# Patient Record
Sex: MALE | Race: WHITE | NOT HISPANIC OR LATINO | Employment: FULL TIME | URBAN - METROPOLITAN AREA
[De-identification: names, ages, dates, MRNs, and addresses within clinical notes are randomized per-mention and may not be internally consistent; named-entity substitution may affect disease eponyms.]

---

## 2024-08-15 PROBLEM — K59.00 CONSTIPATION: Status: RESOLVED | Noted: 2024-08-15 | Resolved: 2024-08-15

## 2024-08-15 PROBLEM — Z00.00 HEALTHCARE MAINTENANCE: Status: ACTIVE | Noted: 2024-08-15

## 2024-08-26 ENCOUNTER — LAB (OUTPATIENT)
Dept: LAB | Facility: LAB | Age: 34
End: 2024-08-26
Payer: COMMERCIAL

## 2024-08-26 ENCOUNTER — APPOINTMENT (OUTPATIENT)
Dept: PRIMARY CARE | Facility: CLINIC | Age: 34
End: 2024-08-26
Payer: COMMERCIAL

## 2024-08-26 VITALS
TEMPERATURE: 98.2 F | SYSTOLIC BLOOD PRESSURE: 138 MMHG | OXYGEN SATURATION: 99 % | BODY MASS INDEX: 40.54 KG/M2 | HEIGHT: 70 IN | HEART RATE: 80 BPM | DIASTOLIC BLOOD PRESSURE: 88 MMHG | WEIGHT: 283.2 LBS

## 2024-08-26 DIAGNOSIS — J01.90 ACUTE SINUSITIS, RECURRENCE NOT SPECIFIED, UNSPECIFIED LOCATION: ICD-10-CM

## 2024-08-26 DIAGNOSIS — Z00.00 HEALTHCARE MAINTENANCE: Primary | ICD-10-CM

## 2024-08-26 DIAGNOSIS — R53.83 FATIGUE, UNSPECIFIED TYPE: ICD-10-CM

## 2024-08-26 DIAGNOSIS — E66.09 CLASS 1 OBESITY DUE TO EXCESS CALORIES WITHOUT SERIOUS COMORBIDITY WITH BODY MASS INDEX (BMI) OF 30.0 TO 30.9 IN ADULT: ICD-10-CM

## 2024-08-26 DIAGNOSIS — J30.9 ALLERGIC RHINITIS, UNSPECIFIED SEASONALITY, UNSPECIFIED TRIGGER: ICD-10-CM

## 2024-08-26 DIAGNOSIS — E53.8 VITAMIN B12 DEFICIENCY: ICD-10-CM

## 2024-08-26 DIAGNOSIS — R42 VERTIGO: ICD-10-CM

## 2024-08-26 DIAGNOSIS — E55.9 VITAMIN D DEFICIENCY: Primary | ICD-10-CM

## 2024-08-26 DIAGNOSIS — R05.9 COUGH, UNSPECIFIED TYPE: ICD-10-CM

## 2024-08-26 DIAGNOSIS — Z00.00 HEALTHCARE MAINTENANCE: ICD-10-CM

## 2024-08-26 DIAGNOSIS — F40.248 FEAR OF PUBLIC SPEAKING: ICD-10-CM

## 2024-08-26 PROBLEM — E66.811 CLASS 1 OBESITY DUE TO EXCESS CALORIES WITHOUT SERIOUS COMORBIDITY WITH BODY MASS INDEX (BMI) OF 30.0 TO 30.9 IN ADULT: Status: ACTIVE | Noted: 2024-08-26

## 2024-08-26 LAB
25(OH)D3 SERPL-MCNC: 20 NG/ML (ref 30–100)
ALBUMIN SERPL BCP-MCNC: 4.7 G/DL (ref 3.4–5)
ALP SERPL-CCNC: 61 U/L (ref 33–120)
ALT SERPL W P-5'-P-CCNC: 33 U/L (ref 10–52)
ANION GAP SERPL CALC-SCNC: 13 MMOL/L (ref 10–20)
AST SERPL W P-5'-P-CCNC: 22 U/L (ref 9–39)
BASOPHILS # BLD AUTO: 0.04 X10*3/UL (ref 0–0.1)
BASOPHILS NFR BLD AUTO: 0.4 %
BILIRUB SERPL-MCNC: 0.4 MG/DL (ref 0–1.2)
BUN SERPL-MCNC: 10 MG/DL (ref 6–23)
CALCIUM SERPL-MCNC: 9.3 MG/DL (ref 8.6–10.3)
CHLORIDE SERPL-SCNC: 103 MMOL/L (ref 98–107)
CHOLEST SERPL-MCNC: 204 MG/DL (ref 0–199)
CHOLESTEROL/HDL RATIO: 5.6
CO2 SERPL-SCNC: 27 MMOL/L (ref 21–32)
CREAT SERPL-MCNC: 0.77 MG/DL (ref 0.5–1.3)
EGFRCR SERPLBLD CKD-EPI 2021: >90 ML/MIN/1.73M*2
EOSINOPHIL # BLD AUTO: 0.13 X10*3/UL (ref 0–0.7)
EOSINOPHIL NFR BLD AUTO: 1.3 %
ERYTHROCYTE [DISTWIDTH] IN BLOOD BY AUTOMATED COUNT: 14.6 % (ref 11.5–14.5)
GLUCOSE SERPL-MCNC: 90 MG/DL (ref 74–99)
HCT VFR BLD AUTO: 43.3 % (ref 41–52)
HDLC SERPL-MCNC: 36.7 MG/DL
HGB BLD-MCNC: 14.1 G/DL (ref 13.5–17.5)
IMM GRANULOCYTES # BLD AUTO: 0.03 X10*3/UL (ref 0–0.7)
IMM GRANULOCYTES NFR BLD AUTO: 0.3 % (ref 0–0.9)
LDLC SERPL CALC-MCNC: 135 MG/DL
LYMPHOCYTES # BLD AUTO: 2.54 X10*3/UL (ref 1.2–4.8)
LYMPHOCYTES NFR BLD AUTO: 25.1 %
MCH RBC QN AUTO: 26.2 PG (ref 26–34)
MCHC RBC AUTO-ENTMCNC: 32.6 G/DL (ref 32–36)
MCV RBC AUTO: 81 FL (ref 80–100)
MONOCYTES # BLD AUTO: 0.71 X10*3/UL (ref 0.1–1)
MONOCYTES NFR BLD AUTO: 7 %
NEUTROPHILS # BLD AUTO: 6.68 X10*3/UL (ref 1.2–7.7)
NEUTROPHILS NFR BLD AUTO: 65.9 %
NON HDL CHOLESTEROL: 167 MG/DL (ref 0–149)
NRBC BLD-RTO: 0 /100 WBCS (ref 0–0)
PLATELET # BLD AUTO: 292 X10*3/UL (ref 150–450)
POTASSIUM SERPL-SCNC: 3.8 MMOL/L (ref 3.5–5.3)
PROT SERPL-MCNC: 7.4 G/DL (ref 6.4–8.2)
RBC # BLD AUTO: 5.38 X10*6/UL (ref 4.5–5.9)
SODIUM SERPL-SCNC: 139 MMOL/L (ref 136–145)
TRIGL SERPL-MCNC: 162 MG/DL (ref 0–149)
TSH SERPL-ACNC: 2.24 MIU/L (ref 0.44–3.98)
VIT B12 SERPL-MCNC: 206 PG/ML (ref 211–911)
VLDL: 32 MG/DL (ref 0–40)
WBC # BLD AUTO: 10.1 X10*3/UL (ref 4.4–11.3)

## 2024-08-26 PROCEDURE — 1036F TOBACCO NON-USER: CPT | Performed by: FAMILY MEDICINE

## 2024-08-26 PROCEDURE — 99395 PREV VISIT EST AGE 18-39: CPT | Performed by: FAMILY MEDICINE

## 2024-08-26 PROCEDURE — 82607 VITAMIN B-12: CPT

## 2024-08-26 PROCEDURE — 85025 COMPLETE CBC W/AUTO DIFF WBC: CPT

## 2024-08-26 PROCEDURE — 36415 COLL VENOUS BLD VENIPUNCTURE: CPT

## 2024-08-26 PROCEDURE — 84443 ASSAY THYROID STIM HORMONE: CPT

## 2024-08-26 PROCEDURE — 80053 COMPREHEN METABOLIC PANEL: CPT

## 2024-08-26 PROCEDURE — 80061 LIPID PANEL: CPT

## 2024-08-26 PROCEDURE — 99214 OFFICE O/P EST MOD 30 MIN: CPT | Performed by: FAMILY MEDICINE

## 2024-08-26 PROCEDURE — 82306 VITAMIN D 25 HYDROXY: CPT

## 2024-08-26 PROCEDURE — 3008F BODY MASS INDEX DOCD: CPT | Performed by: FAMILY MEDICINE

## 2024-08-26 RX ORDER — FLUTICASONE PROPIONATE AND SALMETEROL 250; 50 UG/1; UG/1
1 POWDER RESPIRATORY (INHALATION)
Qty: 60 EACH | Refills: 2 | Status: SHIPPED | OUTPATIENT
Start: 2024-08-26 | End: 2024-11-24

## 2024-08-26 RX ORDER — PROPRANOLOL HYDROCHLORIDE 10 MG/1
10 TABLET ORAL 2 TIMES DAILY PRN
Qty: 30 TABLET | Refills: 0 | Status: SHIPPED | OUTPATIENT
Start: 2024-08-26 | End: 2025-08-26

## 2024-08-26 RX ORDER — FLUTICASONE PROPIONATE 50 MCG
1 SPRAY, SUSPENSION (ML) NASAL DAILY
Qty: 16 G | Refills: 2 | Status: SHIPPED | OUTPATIENT
Start: 2024-08-26 | End: 2025-08-26

## 2024-08-26 RX ORDER — LANOLIN ALCOHOL/MO/W.PET/CERES
1000 CREAM (GRAM) TOPICAL DAILY
Qty: 90 TABLET | Refills: 1 | Status: SHIPPED | OUTPATIENT
Start: 2024-08-26 | End: 2025-02-22

## 2024-08-26 RX ORDER — DOXYCYCLINE 100 MG/1
100 CAPSULE ORAL 2 TIMES DAILY
Qty: 20 CAPSULE | Refills: 0 | Status: SHIPPED | OUTPATIENT
Start: 2024-08-26 | End: 2024-09-05

## 2024-08-26 RX ORDER — ERGOCALCIFEROL 1.25 MG/1
50000 CAPSULE ORAL
Qty: 13 CAPSULE | Refills: 1 | Status: SHIPPED | OUTPATIENT
Start: 2024-09-01 | End: 2025-03-02

## 2024-08-26 ASSESSMENT — PATIENT HEALTH QUESTIONNAIRE - PHQ9
2. FEELING DOWN, DEPRESSED OR HOPELESS: NOT AT ALL
1. LITTLE INTEREST OR PLEASURE IN DOING THINGS: NOT AT ALL
SUM OF ALL RESPONSES TO PHQ9 QUESTIONS 1 AND 2: 0

## 2024-08-26 NOTE — ASSESSMENT & PLAN NOTE
Patient may have a component of reactive airway disease or asthma.  Will start him on Advair in addition to the antibiotic and he can also use albuterol as needed.  Follow-up in 1 month or sooner with any concerns.

## 2024-08-26 NOTE — ASSESSMENT & PLAN NOTE
Continue to work on healthy diet and exercise.  Screening blood work ordered.  Immunizations up-to-date.

## 2024-08-26 NOTE — ASSESSMENT & PLAN NOTE
Patient complaining of ulnar 7-day history of sinus congestion and cough reports symptoms are worsening.  Will treat with doxycycline and Flonase.  Patient also to see ENT due to recurrent

## 2024-08-26 NOTE — ASSESSMENT & PLAN NOTE
Reviewed that some of patient's symptoms may be related to allergies.  Patient did not feel that allergy medicine helped.  Will have him start Flonase and follow-up in a month.

## 2024-08-26 NOTE — PROGRESS NOTES
Subjective   Patient ID: Alan Newton is a 34 y.o. male who presents for Annual Exam (AWV  - pt has some chest congestion started 8/21 has not tested for covid).  Patient presents today for a physical and also has several other concerns.  He states that several times a year he gets congestion and cough with green nasal drainage.  He states that other family members get sick but his usually last several weeks.  He states that he has a cough that is productive.  He has not had any fevers or chills.  He states this has been going on over a week.  He denies any chest pain or shortness of breath.  No vomiting or diarrhea.  He does not have a history of asthma.  He does have an albuterol inhaler that he tried and thinks helped a little bit.  He is not certain what else to take.  He states that he was given a Medrol Dosepak in the past and this caused a lot of redness but he thinks it may have helped his breathing.  He states that he also feels like a spinning sensation from time to time.  He states that he has always had motion sickness but that it is worse when he is congested like this.  He also was interested in trying to lose weight.  He also has concerns about public speaking.  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  He states he does not feel anxious or worried otherwise.  He denies any other concerns.  HPI  Social History     Socioeconomic History    Marital status:      Spouse name: Not on file    Number of children: Not on file    Years of education: Not on file    Highest education level: Not on file   Occupational History    Not on file   Tobacco Use    Smoking status: Never    Smokeless tobacco: Never   Substance and Sexual Activity    Alcohol use: Not Currently    Drug use: Not Currently    Sexual activity: Not on file   Other Topics Concern    Not on file   Social History Narrative    Not on  file     Social Determinants of Health     Financial Resource Strain: Not on file   Food Insecurity: Not on file   Transportation Needs: Not on file   Physical Activity: Not on file   Stress: Not on file   Social Connections: Not on file   Intimate Partner Violence: Not on file   Housing Stability: Not on file     Current Outpatient Medications   Medication Sig Dispense Refill    cyanocobalamin (Vitamin B-12) 1,000 mcg tablet Take 1 tablet (1,000 mcg) by mouth once daily. 90 tablet 1    doxycycline (Vibramycin) 100 mg capsule Take 1 capsule (100 mg) by mouth 2 times a day for 10 days. Take with at least 8 ounces (large glass) of water, do not lie down for 30 minutes after 20 capsule 0    [START ON 9/1/2024] ergocalciferol (Vitamin D-2) 1.25 MG (42754 UT) capsule Take 1 capsule (50,000 Units) by mouth 1 (one) time per week. 13 capsule 1    fluticasone (Flonase) 50 mcg/actuation nasal spray Administer 1 spray into each nostril once daily. Shake gently. Before first use, prime pump. After use, clean tip and replace cap. 16 g 2    fluticasone propion-salmeteroL (Advair Diskus) 250-50 mcg/dose diskus inhaler Inhale 1 puff 2 times a day. Rinse mouth with water after use to reduce aftertaste and incidence of candidiasis. Do not swallow. 60 each 2    propranolol (Inderal) 10 mg tablet Take 1 tablet (10 mg) by mouth 2 times a day as needed (public speaking). 30 tablet 0     No current facility-administered medications for this visit.     Family History   Problem Relation Name Age of Onset    Heart disease Father      Colon cancer Mother's Brother      Sleep apnea Father's Brother      Colon cancer Maternal Grandfather      Other (pacemaker) Paternal Grandfather       Review of Systems  Immunization History   Administered Date(s) Administered    DTaP vaccine, pediatric  (INFANRIX) 06/30/1997    DTaP, Unspecified 1990, 1990, 1992, 08/09/1993    Hepatitis B vaccine, 19 yrs and under (RECOMBIVAX, ENGERIX)  "08/17/2000, 09/22/2000, 10/17/2002    HiB PRP-T conjugate vaccine (HIBERIX, ACTHIB) 1992, 1992, 1992, 09/08/1993    Vamsi SARS-CoV-2 Vaccination 07/01/2021    MMR vaccine, subcutaneous (MMR II) 08/09/1993, 08/17/2000    Meningococcal ACWY vaccine (MENVEO) 05/15/2007    Polio, Unspecified 1992, 1992, 08/09/1993, 06/30/1997    Td vaccine, age 7 years and older (TDVAX) 09/16/2004    Tdap vaccine, age 7 year and older (BOOSTRIX, ADACEL) 02/17/2020    Varicella vaccine, subcutaneous (VARIVAX) 02/10/1997       Review of Systems negative except as noted in HPI and Chief complaint.     Objective                 /88   Pulse 80   Temp 36.8 °C (98.2 °F)   Ht 1.778 m (5' 10\")   Wt 128 kg (283 lb 3.2 oz)   SpO2 99%   BMI 40.63 kg/m²    Physical Exam  Vitals reviewed.   HENT:      Head: Normocephalic and atraumatic.      Right Ear: Tympanic membrane normal.      Left Ear: Tympanic membrane normal.      Nose: Nose normal.      Mouth/Throat:      Pharynx: Oropharynx is clear.   Eyes:      Extraocular Movements: Extraocular movements intact.      Conjunctiva/sclera: Conjunctivae normal.      Pupils: Pupils are equal, round, and reactive to light.   Cardiovascular:      Rate and Rhythm: Normal rate and regular rhythm.      Pulses: Normal pulses.   Pulmonary:      Effort: Pulmonary effort is normal.      Breath sounds: Normal breath sounds.   Abdominal:      General: There is no distension.      Palpations: Abdomen is soft.      Tenderness: There is no abdominal tenderness.   Musculoskeletal:         General: Normal range of motion.      Cervical back: Normal range of motion and neck supple.      Right lower leg: No edema.      Left lower leg: No edema.   Lymphadenopathy:      Cervical: No cervical adenopathy.   Neurological:      General: No focal deficit present.      Mental Status: He is alert.       No results found for this or any previous visit (from the past 96 " hour(s)).    Assessment/Plan   Problem List Items Addressed This Visit       Healthcare maintenance - Primary     Continue to work on healthy diet and exercise.  Screening blood work ordered.  Immunizations up-to-date.         Relevant Orders    Lipid Panel (Completed)    Allergic rhinitis     Reviewed that some of patient's symptoms may be related to allergies.  Patient did not feel that allergy medicine helped.  Will have him start Flonase and follow-up in a month.         Relevant Medications    fluticasone (Flonase) 50 mcg/actuation nasal spray    Class 1 obesity due to excess calories without serious comorbidity with body mass index (BMI) of 30.0 to 30.9 in adult    Fatigue     Patient reports feeling tired frequently.  States that he did used to snore but this is improved since he has lost some weight.  Will plan to check blood work.  Declines testing for sleep apnea at this point.  Follow-up in 1 month to reevaluate.         Relevant Orders    Vitamin D 25-Hydroxy,Total (for eval of Vitamin D levels) (Completed)    Vitamin B12 (Completed)    TSH with reflex to Free T4 if abnormal (Completed)    Comprehensive Metabolic Panel (Completed)    CBC and Auto Differential (Completed)    Vertigo     Patient reports spinning sensation occurring occasionally.  States he has always had motion sickness.  Reports it has been worse since he has had these cold symptoms.  Will have him start Flonase and also see ENT for further evaluation.         Relevant Orders    Referral to ENT    Cough     Patient may have a component of reactive airway disease or asthma.  Will start him on Advair in addition to the antibiotic and he can also use albuterol as needed.  Follow-up in 1 month or sooner with any concerns.         Relevant Medications    fluticasone propion-salmeteroL (Advair Diskus) 250-50 mcg/dose diskus inhaler    Fear of public speaking     Patient reports fear of public speaking.  I did give him prescription for  propranolol.  He can try this prior to public speaking.  Warned that this could cause fatigue as it does slow down the heart rate.         Relevant Medications    propranolol (Inderal) 10 mg tablet    Acute sinusitis     Patient complaining of ulnar 7-day history of sinus congestion and cough reports symptoms are worsening.  Will treat with doxycycline and Flonase.  Patient also to see ENT due to recurrent         Relevant Medications    doxycycline (Vibramycin) 100 mg capsule    Other Relevant Orders    Referral to ENT

## 2024-08-26 NOTE — ASSESSMENT & PLAN NOTE
Patient reports spinning sensation occurring occasionally.  States he has always had motion sickness.  Reports it has been worse since he has had these cold symptoms.  Will have him start Flonase and also see ENT for further evaluation.

## 2024-08-26 NOTE — ASSESSMENT & PLAN NOTE
Patient reports fear of public speaking.  I did give him prescription for propranolol.  He can try this prior to public speaking.  Warned that this could cause fatigue as it does slow down the heart rate.

## 2024-08-26 NOTE — ASSESSMENT & PLAN NOTE
Patient reports feeling tired frequently.  States that he did used to snore but this is improved since he has lost some weight.  Will plan to check blood work.  Declines testing for sleep apnea at this point.  Follow-up in 1 month to reevaluate.

## 2024-08-27 ENCOUNTER — TELEPHONE (OUTPATIENT)
Dept: CARDIOLOGY | Facility: CLINIC | Age: 34
End: 2024-08-27
Payer: COMMERCIAL

## 2024-08-27 DIAGNOSIS — R05.9 COUGH, UNSPECIFIED TYPE: Primary | ICD-10-CM

## 2024-08-27 NOTE — TELEPHONE ENCOUNTER
----- Message from Angela Rodarte sent at 8/26/2024  1:42 PM EDT -----  Please advise patient that vitamins B12 and D are low.  Supplement sent to the pharmacy.  Cholesterol is mildly elevated.  Recommend working on healthy diet and exercise.  Other blood work is normal.  We will discuss further at his follow-up.

## 2024-08-27 NOTE — TELEPHONE ENCOUNTER
Patient left voicemail stating he was informed by Pharmacy that vitamin D supplement was there. Patient not sure if he was supposed to be prescribed this medication.   T/c to patient to inform him that he is supposed to be taking due to low vitamin D with recent blood work. Patient also stated is not taking B12 supplement

## 2024-09-03 ENCOUNTER — DOCUMENTATION (OUTPATIENT)
Dept: PRIMARY CARE | Facility: CLINIC | Age: 34
End: 2024-09-03
Payer: COMMERCIAL

## 2024-09-03 RX ORDER — BUDESONIDE AND FORMOTEROL FUMARATE DIHYDRATE 80; 4.5 UG/1; UG/1
2 AEROSOL RESPIRATORY (INHALATION)
Qty: 10.2 G | Refills: 2 | Status: SHIPPED | OUTPATIENT
Start: 2024-09-03 | End: 2025-09-03

## 2024-09-03 NOTE — PROGRESS NOTES
Prior Authorization was denied for Advair Diskus. Per insurance, use Advair HFA, Breo Ellipta, or Symbicort   Parent details…

## 2024-10-08 ENCOUNTER — APPOINTMENT (OUTPATIENT)
Dept: PRIMARY CARE | Facility: CLINIC | Age: 34
End: 2024-10-08
Payer: COMMERCIAL

## 2024-10-14 ENCOUNTER — APPOINTMENT (OUTPATIENT)
Dept: PRIMARY CARE | Facility: CLINIC | Age: 34
End: 2024-10-14
Payer: COMMERCIAL

## 2024-11-20 ENCOUNTER — OFFICE VISIT (OUTPATIENT)
Dept: OTOLARYNGOLOGY | Facility: CLINIC | Age: 34
End: 2024-11-20
Payer: COMMERCIAL

## 2024-11-20 ENCOUNTER — CLINICAL SUPPORT (OUTPATIENT)
Dept: AUDIOLOGY | Facility: CLINIC | Age: 34
End: 2024-11-20
Payer: COMMERCIAL

## 2024-11-20 VITALS
HEIGHT: 70 IN | OXYGEN SATURATION: 97 % | WEIGHT: 289 LBS | HEART RATE: 70 BPM | DIASTOLIC BLOOD PRESSURE: 87 MMHG | BODY MASS INDEX: 41.37 KG/M2 | SYSTOLIC BLOOD PRESSURE: 129 MMHG

## 2024-11-20 DIAGNOSIS — R42 DIZZINESS AND GIDDINESS: Primary | ICD-10-CM

## 2024-11-20 DIAGNOSIS — R42 VERTIGO: ICD-10-CM

## 2024-11-20 PROCEDURE — 3008F BODY MASS INDEX DOCD: CPT | Performed by: NURSE PRACTITIONER

## 2024-11-20 PROCEDURE — 1036F TOBACCO NON-USER: CPT | Performed by: NURSE PRACTITIONER

## 2024-11-20 PROCEDURE — 99214 OFFICE O/P EST MOD 30 MIN: CPT | Performed by: NURSE PRACTITIONER

## 2024-11-20 PROCEDURE — 92557 COMPREHENSIVE HEARING TEST: CPT | Performed by: AUDIOLOGIST

## 2024-11-20 PROCEDURE — 92550 TYMPANOMETRY & REFLEX THRESH: CPT | Performed by: AUDIOLOGIST

## 2024-11-20 PROCEDURE — 99204 OFFICE O/P NEW MOD 45 MIN: CPT | Performed by: NURSE PRACTITIONER

## 2024-11-20 SDOH — ECONOMIC STABILITY: FOOD INSECURITY: WITHIN THE PAST 12 MONTHS, YOU WORRIED THAT YOUR FOOD WOULD RUN OUT BEFORE YOU GOT MONEY TO BUY MORE.: NEVER TRUE

## 2024-11-20 SDOH — ECONOMIC STABILITY: FOOD INSECURITY: WITHIN THE PAST 12 MONTHS, THE FOOD YOU BOUGHT JUST DIDN'T LAST AND YOU DIDN'T HAVE MONEY TO GET MORE.: NEVER TRUE

## 2024-11-20 ASSESSMENT — LIFESTYLE VARIABLES
AUDIT-C TOTAL SCORE: 2
SKIP TO QUESTIONS 9-10: 1
HOW OFTEN DO YOU HAVE SIX OR MORE DRINKS ON ONE OCCASION: NEVER
HOW MANY STANDARD DRINKS CONTAINING ALCOHOL DO YOU HAVE ON A TYPICAL DAY: 1 OR 2
HOW OFTEN DO YOU HAVE A DRINK CONTAINING ALCOHOL: 2-4 TIMES A MONTH

## 2024-11-20 ASSESSMENT — ENCOUNTER SYMPTOMS
DEPRESSION: 0
LOSS OF SENSATION IN FEET: 0
OCCASIONAL FEELINGS OF UNSTEADINESS: 0

## 2024-11-20 ASSESSMENT — PAIN SCALES - GENERAL: PAINLEVEL_OUTOF10: 0-NO PAIN

## 2024-11-20 ASSESSMENT — COLUMBIA-SUICIDE SEVERITY RATING SCALE - C-SSRS
2. HAVE YOU ACTUALLY HAD ANY THOUGHTS OF KILLING YOURSELF?: NO
1. IN THE PAST MONTH, HAVE YOU WISHED YOU WERE DEAD OR WISHED YOU COULD GO TO SLEEP AND NOT WAKE UP?: NO
6. HAVE YOU EVER DONE ANYTHING, STARTED TO DO ANYTHING, OR PREPARED TO DO ANYTHING TO END YOUR LIFE?: NO

## 2024-11-20 NOTE — PROGRESS NOTES
"AUDIOLOGY ADULT AUDIOMETRIC EVALUATION      Name:  Alan Newton  :  1990  Age:  34 y.o.  Date of Evaluation:  2024     HISTORY  Reason for visit:  dizziness  Mr. Newton is seen 2024 at the request of Jordan Saldana CNP  for an evaluation of hearing.      Chief complaint:    Dizziness, feeling like he is going to pass out, vertigo with nausea from motion sickness (e.g., boat, bus); occasionally happens at work (not related to motion); can be brought on my visual stimulus    Hearing loss:  fine; has not noticed any asymmetry   Tinnitus:   intermittent, bilateral, not bothersome  Otitis Media: denies  Otologic surgical history:  denies  Dizziness/imbalance:  yes  Otalgia:  denies  Ear pressure/fullness:  denies  History of excessive noise exposure:  denies  Other: none         EVALUATION  Please find audiogram in \"Media\" tab (Document Type:  Audiology Report) or included at the bottom of this note.    RESULTS   Otoscopic Evaluation: non-occlusive cerumen bilaterally      Immittance Measures (226 Hz probe tone):   Tympanometry is consistent with normal middle ear pressure and normal tympanic membrane mobility bilaterally.       Ipsilateral acoustic reflexes (500-4000 Hz) are present for 500-2000 Hz bilaterally.     Test technique:  standard behavioral technique via T[facility] earphones.  Reliability is good.    Pure Tone Audiometry:  Hearing sensitivity is within normal limits bilaterally     Speech Audiometry:        Right Ear:  Speech Reception Threshold (SRT) was obtained at 5 dBHL                 Speech discrimination score was 100% in quiet when words were presented at 55 dBHL (10 item NU-6 ordered-by-difficulty list).        Left Ear:  Speech Reception Threshold (SRT) was obtained at 5 dBHL                 Speech discrimination score was 100% in quiet when words were presented at 55 dBHL (10 item NU-6 ordered-by-difficulty list).      IMPRESSIONS:  Tympanometry is consistent with normal middle ear " pressure and normal tympanic membrane mobility bilaterally.        Hearing sensitivity is within normal limits bilaterally      RECOMMENDATIONS  Continue with medical follow-up with Jordan Saldana CNP.     Reassess hearing as medically indicated or if there is a concern for a change in hearing.    Continue with medical follow-up as indicated.       PATIENT EDUCATION  Discussed results and recommendations with patient.  Questions were addressed and the patient was encouraged to contact our department should concerns arise.       ROBERT Macedo, CCC-A  Licensed Audiologist

## 2024-11-21 NOTE — PROGRESS NOTES
Subjective   Patient ID: Alan Newton is a 34 y.o. male who presents for Dizziness.  HPI    Patient reports that he has had motion sickness all his life.  He has noticed that room spinning has been more frequent over the past 5 years.  The room spinning is usually brief.  He is unsure if this was anxiety driven or due to his motion sickness.  He takes Dramamine when actively vertiginous.  His imbalance has not resulted to falls.  Denies having frequent headaches although he has sensitivity to loud sounds and bright lights.  No prior ear surgery or trauma to the ears.  No family history of ear disease.  No complaints of otalgia or otorrhea.  Denies having hearing loss.          Audiogram findings showed normal hearing across all frequencies bilateral ears.  Normal tympanogram bilateral.  Excellent word discrimination bilaterally.  Acoustic reflexes present right and left ipsilateral.    Review of Systems      All other systems have been reviewed and are negative for complaints except for those mentioned in history of present illness, past medical history and problem list       Objective   Physical Exam    CONSTITUTIONAL: No acute distress, normal facial features; No fever; no chills  VOICE: No hoarseness or other audible abnormality  RESPIRATION: Breathing comfortably, no stridor; normal breathing effort  CV: No cyanosis visible on the face and neck area  EYES:Pupils equal and round ; no erythema; conjunctiva clear; sclera white  NEURO: Alert and oriented, able to raise eyebrows symmetrical bilateral, smile with no facial droop, able to swallow  HEAD AND FACE: Symmetric facial features, no masses or lesions    Right ear examination: External ear normal.  EAC is clear.  TM intact with no effusion, retraction or perforation.  Left ear examination: External ear normal.  EAC is clear.  TM intact with no effusion, retraction or perforation.    Headshake: Normal  Fukuda: Normal  Tandem gait: Normal  Romberg:  Normal    NOSE: External nose midline; inferior nasal turbinates normal no mucopus or polyps.  ORAL CAVITY: No lesions of external lips; uvula is midline; tongue with good mobility; no gross mass in oral cavity; mucosa appears pink   NECK/LYMPH: No obvious deformity or lesions; trachea is midline  PSYCH: Alert and oriented with appropriate mood and affect      Assessment/Plan       1. Vertigo  Referral to ENT    Electronystagmography    V-Hit    Cervical +Ocular VEMP          His clinical exam today showed no evidence of acute infection, inflammation or obstruction in both the ears.  In office balance exam showed no nystagmus and Fukuda, tandem gait and Romberg are normal.  Unclear at this time if this is a migraine variant related vertigo.  I recommend to further test with ENG, V head and VEMP to rule out peripheral vertigo causes.       CATY Mueller-ELIZ 11/21/24 3:24 PM

## 2025-07-08 ENCOUNTER — PATIENT MESSAGE (OUTPATIENT)
Dept: PRIMARY CARE | Facility: CLINIC | Age: 35
End: 2025-07-08
Payer: COMMERCIAL

## 2025-07-08 ENCOUNTER — OFFICE VISIT (OUTPATIENT)
Dept: CARDIOLOGY | Facility: CLINIC | Age: 35
End: 2025-07-08
Payer: COMMERCIAL

## 2025-07-08 VITALS
DIASTOLIC BLOOD PRESSURE: 72 MMHG | SYSTOLIC BLOOD PRESSURE: 122 MMHG | WEIGHT: 290 LBS | HEIGHT: 70 IN | HEART RATE: 61 BPM | OXYGEN SATURATION: 99 % | RESPIRATION RATE: 18 BRPM | BODY MASS INDEX: 41.52 KG/M2

## 2025-07-08 DIAGNOSIS — Z82.49 FAMILY HISTORY OF HEART DISEASE: Primary | ICD-10-CM

## 2025-07-08 DIAGNOSIS — J45.40 MODERATE PERSISTENT REACTIVE AIRWAY DISEASE WITHOUT COMPLICATION (HHS-HCC): Primary | ICD-10-CM

## 2025-07-08 DIAGNOSIS — Z82.49 FAMILY HISTORY OF HEART DISEASE: ICD-10-CM

## 2025-07-08 DIAGNOSIS — R06.02 SHORTNESS OF BREATH: ICD-10-CM

## 2025-07-08 DIAGNOSIS — J45.909 REACTIVE AIRWAY DISEASE WITHOUT COMPLICATION, UNSPECIFIED ASTHMA SEVERITY, UNSPECIFIED WHETHER PERSISTENT (HHS-HCC): ICD-10-CM

## 2025-07-08 DIAGNOSIS — R07.9 CHEST PAIN, UNSPECIFIED TYPE: Primary | ICD-10-CM

## 2025-07-08 PROCEDURE — 93000 ELECTROCARDIOGRAM COMPLETE: CPT | Performed by: NURSE PRACTITIONER

## 2025-07-08 PROCEDURE — 1036F TOBACCO NON-USER: CPT | Performed by: NURSE PRACTITIONER

## 2025-07-08 PROCEDURE — 3008F BODY MASS INDEX DOCD: CPT | Performed by: NURSE PRACTITIONER

## 2025-07-08 PROCEDURE — 99204 OFFICE O/P NEW MOD 45 MIN: CPT | Performed by: NURSE PRACTITIONER

## 2025-07-08 RX ORDER — ALBUTEROL SULFATE 90 UG/1
2 INHALANT RESPIRATORY (INHALATION) EVERY 4 HOURS PRN
Qty: 8 G | Refills: 1 | Status: SHIPPED | OUTPATIENT
Start: 2025-07-08 | End: 2026-07-08

## 2025-07-08 NOTE — PROGRESS NOTES
Informed patient  He was hoping to not have to follow up and said he will see how things go after stress test   He said he will call back to schedule

## 2025-07-08 NOTE — PROGRESS NOTES
Please advise patient that I received a message from the cardiologist that patient did not have a rescue inhaler.  I sent a prescription for albuterol for him to use as needed.  Use the Symbicort on a daily basis.  Also please have him make a follow-up appointment with me.

## 2025-07-08 NOTE — PATIENT INSTRUCTIONS
- exercise stress test  - take your symbicort twice a day & daily. Rinse mouth out after every use as there is a steroid in it which can cause thrush  - follow up with Dr. Rodarte about rescue inhaler (which I'm going to message her on too)  - follow up with me after stress test to review result    It was my pleasure to meet you. I look forward to being your cardiac Nurse Practitioner. I am a huge believer in communicating with my patients. Please contact me at any time, if anything is not clear to you regarding anything we have discussed, or if new questions occur to you.

## 2025-07-08 NOTE — PROGRESS NOTES
"Name : Alan Newton   : 1990   MRN : [id number]   ENC Date : 2025    CC:   Chest Pain, Shortness of Breath, Dizziness, and CV Evaluation     HPI:    Alan Newton is an obese 35 y.o. male with PMHx sig for HLD & reactive airway disease who presents today as above    2 weeks ago he went to work, felt fine.  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 All of the sudden he got these weird pains in his chest, face turned purple which coworkers commented on, body aches & a little SOB. He did not go to the ED, but instead went home, went to bed & woke up the next morning feeling better.    Today's visit is prompted by his Wife messaging his PCP who advised cards evaluation.    No further CP nor SOB. Intermittently using his inhaler (symbicort).    Father with \"small heart attack maybe in his 40s\"    EKG today shows NSR with no ischemic changes    CV Diagnostics:  As above    ROS: unless otherwise noted in the history of present illness, all other systems were reviewed and they are negative for complaints     PMH:  As above    PSH:  noncontributory    SHx:  He reports that he has never smoked. He has never used smokeless tobacco. He reports that he does not currently use alcohol. He reports that he does not currently use drugs.    FHx:  Family History   Problem Relation Name Age of Onset    Heart disease Father      Colon cancer Mother's Brother      Sleep apnea Father's Brother      Colon cancer Maternal Grandfather      Other (pacemaker) Paternal Grandfather        Allergies:  Methylprednisolone and Penicillin    Outpatient Medications:  Current Outpatient Medications   Medication Instructions    budesonide-formoteroL (Symbicort) 80-4.5 mcg/actuation inhaler 2 puffs, inhalation, 2 times daily RT, Rinse mouth with water after use to reduce aftertaste and incidence of candidiasis. Do not swallow.    fluticasone (Flonase) 50 mcg/actuation nasal spray 1 spray, Each Nostril, Daily, Shake gently. Before first use, prime " "pump. After use, clean tip and replace cap.    propranolol (INDERAL) 10 mg, oral, 2 times daily PRN     Last Recorded Vitals:  Vitals:    07/08/25 1204   BP: 122/72   BP Location: Left arm   Patient Position: Sitting   Pulse: 61   Resp: 18   SpO2: 99%   Weight: 132 kg (290 lb)   Height: 1.778 m (5' 10\")     Physical Exam:  On exam Mr. Alan Newton appears his stated age, is alert and oriented x3, and in no acute distress. His sclera are anicteric and his oropharynx has moist mucous membranes. His neck is supple and without thyromegaly. The JVP is ~5 cm of water above the right atrium. His cardiac exam has regular rhythm, normal S1, S2. No S3/4. There are no murmurs. His lungs are clear to auscultation bilaterally and there is no dullness to percussion. His abdomen is soft, nontender with normoactive bowel sounds. There is no HJR. The extremities are warm and without edema. The skin is dry. There is no rash present. The distal pulses are 2-3+ in all four extremities. His mood and affect are appropriate for todays encounter.      Last Labs:  CBC -  Lab Results   Component Value Date    WBC 10.1 08/26/2024    HGB 14.1 08/26/2024    HCT 43.3 08/26/2024    MCV 81 08/26/2024     08/26/2024       CMP -  Lab Results   Component Value Date    CALCIUM 9.3 08/26/2024    PROT 7.4 08/26/2024    ALBUMIN 4.7 08/26/2024    AST 22 08/26/2024    ALT 33 08/26/2024    ALKPHOS 61 08/26/2024    BILITOT 0.4 08/26/2024       LIPID PANEL -   Lab Results   Component Value Date    CHOL 204 (H) 08/26/2024    TRIG 162 (H) 08/26/2024    HDL 36.7 08/26/2024    CHHDL 5.6 08/26/2024    LDLF 136 (H) 02/17/2020    VLDL 32 08/26/2024    NHDL 167 (H) 08/26/2024       RENAL FUNCTION PANEL -   Lab Results   Component Value Date    GLUCOSE 90 08/26/2024     08/26/2024    K 3.8 08/26/2024     08/26/2024    CO2 27 08/26/2024    ANIONGAP 13 08/26/2024    BUN 10 08/26/2024    CREATININE 0.77 08/26/2024    CALCIUM 9.3 08/26/2024    ALBUMIN " "4.7 08/26/2024        No results found for: \"BNP\", \"HGBA1C\"  I have personally reviewed the above lab results: CBC, chemistry, other labs as you see listed & diagnostics, I have specifically listed the results of these tests above.    Assessment/Plan:  Chest pain  Hyperlipidemia  Family history of premature CAD    Exercise stress test    4.  Reactive Airway disease. Not taking Symbicort appropriately. Educated on appropriate use & to rinse mouth after use to reduce risk of thrush. Messaged PCP to see if she wants to add a rescue inhaler for him as well    Follow up after testing      Tracy M Schwab, APRN-CNP  "

## 2025-07-20 ENCOUNTER — OFFICE VISIT (OUTPATIENT)
Dept: URGENT CARE | Age: 35
End: 2025-07-20
Payer: COMMERCIAL

## 2025-07-20 VITALS
HEIGHT: 70 IN | DIASTOLIC BLOOD PRESSURE: 85 MMHG | SYSTOLIC BLOOD PRESSURE: 120 MMHG | BODY MASS INDEX: 41.52 KG/M2 | RESPIRATION RATE: 16 BRPM | TEMPERATURE: 98.4 F | OXYGEN SATURATION: 97 % | HEART RATE: 86 BPM | WEIGHT: 290 LBS

## 2025-07-20 DIAGNOSIS — J40 BRONCHITIS: Primary | ICD-10-CM

## 2025-07-20 RX ORDER — AZITHROMYCIN 250 MG/1
TABLET, FILM COATED ORAL
Qty: 6 TABLET | Refills: 0 | Status: SHIPPED | OUTPATIENT
Start: 2025-07-20 | End: 2025-07-25

## 2025-07-20 RX ORDER — BROMPHENIRAMINE MALEATE, PSEUDOEPHEDRINE HYDROCHLORIDE, AND DEXTROMETHORPHAN HYDROBROMIDE 2; 30; 10 MG/5ML; MG/5ML; MG/5ML
10 SYRUP ORAL 4 TIMES DAILY PRN
Qty: 120 ML | Refills: 0 | Status: SHIPPED | OUTPATIENT
Start: 2025-07-20 | End: 2025-07-30

## 2025-07-20 ASSESSMENT — ENCOUNTER SYMPTOMS
COUGH: 1
PALPITATIONS: 0
NEUROLOGICAL NEGATIVE: 1
EYES NEGATIVE: 1
MUSCULOSKELETAL NEGATIVE: 1
PSYCHIATRIC NEGATIVE: 1
CONSTITUTIONAL NEGATIVE: 1
ENDOCRINE NEGATIVE: 1
ALLERGIC/IMMUNOLOGIC NEGATIVE: 1
GASTROINTESTINAL NEGATIVE: 1
HEMATOLOGIC/LYMPHATIC NEGATIVE: 1

## 2025-07-20 NOTE — PROGRESS NOTES
"Subjective   Patient ID: Alan Newton is a 35 y.o. male. They present today with a chief complaint of Cough (Chest congestion started 7/15. Different thick mucus. Lost of noise in chest when breathing. No fevers ).    History of Present Illness  HPI    Pt presents to urgent care with c/o    productive cough, congestion, headache, generally not feeling well for the past 5 days.  Reports has been taking over-the-counter cold medications and using his Symbicort inhaler with minimal relief.  Pt denies CP, SOB, palpitations, fevers, abd pain, n/v/d, sick contacts, recent travel.        Past Medical History  Allergies as of 07/20/2025 - Reviewed 07/20/2025   Allergen Reaction Noted    Methylprednisolone Dermatitis 08/26/2024    Penicillin Unknown 08/26/2024       Prescriptions Prior to Admission[1]     Medical History[2]    Surgical History[3]     reports that he has never smoked. He has never used smokeless tobacco. He reports that he does not currently use alcohol. He reports that he does not currently use drugs.    Review of Systems  Review of Systems   Constitutional: Negative.    HENT:  Positive for congestion.    Eyes: Negative.    Respiratory:  Positive for cough.    Cardiovascular:  Negative for chest pain and palpitations.   Gastrointestinal: Negative.    Endocrine: Negative.    Genitourinary: Negative.    Musculoskeletal: Negative.    Skin: Negative.    Allergic/Immunologic: Negative.    Neurological: Negative.    Hematological: Negative.    Psychiatric/Behavioral: Negative.     All other systems reviewed and are negative.                                 Objective    Vitals:    07/20/25 0912   BP: 120/85   Pulse: 86   Resp: 16   Temp: 36.9 °C (98.4 °F)   SpO2: 97%   Weight: 132 kg (290 lb)   Height: 1.778 m (5' 10\")     No LMP for male patient.    Physical Exam  Vitals and nursing note reviewed.   Constitutional:       General: He is not in acute distress.     Appearance: Normal appearance. He is obese. He is " not ill-appearing or toxic-appearing.   HENT:      Head: Atraumatic.      Right Ear: Tympanic membrane, ear canal and external ear normal.      Left Ear: Tympanic membrane, ear canal and external ear normal.      Nose: Congestion present.      Mouth/Throat:      Mouth: Mucous membranes are moist.      Pharynx: Oropharynx is clear.     Eyes:      Extraocular Movements: Extraocular movements intact.      Conjunctiva/sclera: Conjunctivae normal.      Pupils: Pupils are equal, round, and reactive to light.       Cardiovascular:      Rate and Rhythm: Normal rate.      Pulses: Normal pulses.      Heart sounds: Normal heart sounds.   Pulmonary:      Effort: Pulmonary effort is normal.      Breath sounds: Rhonchi present.     Skin:     General: Skin is warm and dry.     Neurological:      General: No focal deficit present.      Mental Status: He is alert and oriented to person, place, and time.     Psychiatric:         Mood and Affect: Mood normal.         Behavior: Behavior normal.         Thought Content: Thought content normal.         Procedures    Point of Care Test & Imaging Results from this visit  No results found for this visit on 07/20/25.   Imaging  No results found.    Cardiology, Vascular, and Other Imaging  No other imaging results found for the past 2 days      Diagnostic study results (if any) were reviewed by DAMON Valerio.    Assessment/Plan   Allergies, medications, history, and pertinent labs/EKGs/Imaging reviewed by DAMON Valerio.     Medical Decision Making  Urgent Care Course: Patient presents to the  with rhinorrhea, cough, chest congestion. Patient is afebrile, hemodynamically stable.  Patient denies fever, n/v, cp, sob, ab pain, dysuria, diarrhea.   Given longevity of symptoms will treat patient for bronchitis.  Patient given prescriptions for   Zithromax, Bromfed  Patient given pneumonia warning signs and will f/u with pcp.   Independent Hx provided by: Patient  Social  determinant that may affects healthcare: Pharmacy closed  Pt's case/impression summarized and discussed with: Patient  Likely Dx given clinical picture: Bronchitis  Although not an exhaustive list of Differential Diagnosis (though considered), patient's HPI, PE, and other findings are not suggestive of: Pneumonia, pneumothorax, hemothorax, ACS, PE, bronchospasm, asthma exacerbation  Patient at time of discharge was clinically well-appearing and HDS for outpatient management. The patient and/or family was given the opportunity to ask questions prior to discharge, understood my verbal discussion of the plans for treatment, expected course, indications to return to  or seek further treatment in ED, and the need for timely follow up as directed.    Condition: Stable  Disposition: Discharged      Orders and Diagnoses  Diagnoses and all orders for this visit:  Bronchitis  -     azithromycin (Zithromax) 250 mg tablet; Take 2 tabs (500 mg) by mouth today, than 1 daily for 4 days.  -     brompheniramine-pseudoeph-DM 2-30-10 mg/5 mL syrup; Take 10 mL by mouth 4 times a day as needed for congestion, allergies or cough for up to 10 days.      Medical Admin Record      Patient disposition: Home    Electronically signed by DAMON Valerio  10:13 AM           [1] (Not in a hospital admission)   [2]   Past Medical History:  Diagnosis Date    Constipation 08/15/2024   [3] No past surgical history on file.

## 2025-08-01 ENCOUNTER — HOSPITAL ENCOUNTER (OUTPATIENT)
Dept: CARDIOLOGY | Facility: CLINIC | Age: 35
Discharge: HOME | End: 2025-08-01
Payer: COMMERCIAL

## 2025-08-01 DIAGNOSIS — R07.9 CHEST PAIN, UNSPECIFIED TYPE: ICD-10-CM

## 2025-08-01 DIAGNOSIS — Z82.49 FAMILY HISTORY OF HEART DISEASE: ICD-10-CM

## 2025-08-01 PROCEDURE — 93017 CV STRESS TEST TRACING ONLY: CPT

## 2025-08-13 ENCOUNTER — APPOINTMENT (OUTPATIENT)
Dept: CARDIOLOGY | Facility: CLINIC | Age: 35
End: 2025-08-13
Payer: COMMERCIAL

## 2025-08-13 DIAGNOSIS — E66.811 CLASS 1 OBESITY DUE TO EXCESS CALORIES WITHOUT SERIOUS COMORBIDITY WITH BODY MASS INDEX (BMI) OF 30.0 TO 30.9 IN ADULT: ICD-10-CM

## 2025-08-13 DIAGNOSIS — E66.09 CLASS 1 OBESITY DUE TO EXCESS CALORIES WITHOUT SERIOUS COMORBIDITY WITH BODY MASS INDEX (BMI) OF 30.0 TO 30.9 IN ADULT: ICD-10-CM

## 2025-08-13 DIAGNOSIS — Z82.49 FAMILY HISTORY OF HEART DISEASE: Primary | ICD-10-CM

## 2025-08-13 DIAGNOSIS — E78.2 MIXED HYPERLIPIDEMIA: ICD-10-CM

## 2025-08-13 PROCEDURE — 99214 OFFICE O/P EST MOD 30 MIN: CPT | Performed by: NURSE PRACTITIONER

## 2025-08-13 PROCEDURE — 1036F TOBACCO NON-USER: CPT | Performed by: NURSE PRACTITIONER

## 2025-08-27 LAB
CRP SERPL HS-MCNC: 3.8 MG/L
LPA SERPL-SCNC: 13 NMOL/L

## 2025-09-03 ENCOUNTER — APPOINTMENT (OUTPATIENT)
Dept: RADIOLOGY | Facility: CLINIC | Age: 35
End: 2025-09-03
Payer: COMMERCIAL